# Patient Record
Sex: FEMALE
[De-identification: names, ages, dates, MRNs, and addresses within clinical notes are randomized per-mention and may not be internally consistent; named-entity substitution may affect disease eponyms.]

---

## 2017-04-10 ENCOUNTER — RX ONLY (OUTPATIENT)
Age: 57
Setting detail: RX ONLY
End: 2017-04-10

## 2018-02-19 ENCOUNTER — RX ONLY (OUTPATIENT)
Age: 58
Setting detail: RX ONLY
End: 2018-02-19

## 2018-03-12 ENCOUNTER — RX ONLY (OUTPATIENT)
Age: 58
Setting detail: RX ONLY
End: 2018-03-12

## 2018-03-21 ENCOUNTER — RX ONLY (OUTPATIENT)
Age: 58
Setting detail: RX ONLY
End: 2018-03-21

## 2018-10-29 ENCOUNTER — RX ONLY (OUTPATIENT)
Age: 58
Setting detail: RX ONLY
End: 2018-10-29

## 2018-10-29 RX ORDER — HYDROCORTISONE BUTYRATE 1 MG/G
CREAM TOPICAL
Qty: 1 | Refills: 1 | COMMUNITY
Start: 2018-10-29

## 2019-01-11 ENCOUNTER — APPOINTMENT (RX ONLY)
Dept: URBAN - METROPOLITAN AREA CLINIC 323 | Facility: CLINIC | Age: 59
Setting detail: DERMATOLOGY
End: 2019-01-11

## 2019-01-11 DIAGNOSIS — D22 MELANOCYTIC NEVI: ICD-10-CM

## 2019-01-11 DIAGNOSIS — L90.5 SCAR CONDITIONS AND FIBROSIS OF SKIN: ICD-10-CM

## 2019-01-11 DIAGNOSIS — L81.7 PIGMENTED PURPURIC DERMATOSIS: ICD-10-CM

## 2019-01-11 DIAGNOSIS — L81.4 OTHER MELANIN HYPERPIGMENTATION: ICD-10-CM

## 2019-01-11 DIAGNOSIS — L82.1 OTHER SEBORRHEIC KERATOSIS: ICD-10-CM

## 2019-01-11 PROBLEM — D22.5 MELANOCYTIC NEVI OF TRUNK: Status: ACTIVE | Noted: 2019-01-11

## 2019-01-11 PROBLEM — L24.89 IRRITANT CONTACT DERMATITIS DUE TO OTHER AGENTS: Status: ACTIVE | Noted: 2019-01-11

## 2019-01-11 PROBLEM — D239 BENIGN NEOPLASM OF SKIN, SITE UNSPECIFIED: Status: ACTIVE | Noted: 2019-01-11

## 2019-01-11 PROBLEM — D36.7 BENIGN NEOPLASM OF OTHER SPECIFIED SITES: Status: ACTIVE | Noted: 2019-01-11

## 2019-01-11 PROBLEM — L01.09 OTHER IMPETIGO: Status: ACTIVE | Noted: 2019-01-11

## 2019-01-11 PROBLEM — R20.8 OTHER DISTURBANCES OF SKIN SENSATION: Status: ACTIVE | Noted: 2019-01-11

## 2019-01-11 PROBLEM — D22.9 MELANOCYTIC NEVI, UNSPECIFIED: Status: ACTIVE | Noted: 2019-01-11

## 2019-01-11 PROBLEM — L01.00 IMPETIGO, UNSPECIFIED: Status: ACTIVE | Noted: 2019-01-11

## 2019-01-11 PROBLEM — L24.4 IRRITANT CONTACT DERMATITIS DUE TO DRUGS IN CONTACT WITH SKIN: Status: ACTIVE | Noted: 2019-01-11

## 2019-01-11 PROCEDURE — ? COUNSELING

## 2019-01-11 PROCEDURE — ? SUNSCREEN RECOMMENDATIONS

## 2019-01-11 PROCEDURE — ? PRESCRIPTION

## 2019-01-11 PROCEDURE — 99213 OFFICE O/P EST LOW 20 MIN: CPT

## 2019-01-11 PROCEDURE — ? PRESCRIPTION SAMPLES PROVIDED

## 2019-01-11 RX ORDER — TRIAMCINOLONE ACETONIDE 1 MG/G
OINTMENT TOPICAL
Qty: 1 | Refills: 3 | Status: CANCELLED
Stop reason: NON-AVAIL

## 2019-01-11 RX ORDER — HYP AC/SOD CHL/SOD SUL/SOD PHO 0.009 %
SPRAY, NON-AEROSOL (ML) TOPICAL
Qty: 1 | Refills: 4 | Status: CANCELLED
Stop reason: NON-AVAIL

## 2019-01-11 ASSESSMENT — LOCATION DETAILED DESCRIPTION DERM
LOCATION DETAILED: LEFT PROXIMAL PRETIBIAL REGION
LOCATION DETAILED: RIGHT PROXIMAL PRETIBIAL REGION
LOCATION DETAILED: RIGHT INFERIOR CENTRAL MALAR CHEEK

## 2019-01-11 ASSESSMENT — LOCATION ZONE DERM
LOCATION ZONE: LEG
LOCATION ZONE: FACE

## 2019-01-11 ASSESSMENT — LOCATION SIMPLE DESCRIPTION DERM
LOCATION SIMPLE: RIGHT PRETIBIAL REGION
LOCATION SIMPLE: RIGHT CHEEK
LOCATION SIMPLE: LEFT PRETIBIAL REGION

## 2023-11-09 ENCOUNTER — APPOINTMENT (RX ONLY)
Dept: URBAN - METROPOLITAN AREA CLINIC 133 | Facility: CLINIC | Age: 63
Setting detail: DERMATOLOGY
End: 2023-11-09

## 2023-11-09 DIAGNOSIS — L81.7 PIGMENTED PURPURIC DERMATOSIS: ICD-10-CM

## 2023-11-09 DIAGNOSIS — D22 MELANOCYTIC NEVI: ICD-10-CM

## 2023-11-09 DIAGNOSIS — R23.8 OTHER SKIN CHANGES: ICD-10-CM

## 2023-11-09 DIAGNOSIS — L57.8 OTHER SKIN CHANGES DUE TO CHRONIC EXPOSURE TO NONIONIZING RADIATION: ICD-10-CM

## 2023-11-09 DIAGNOSIS — L82.1 OTHER SEBORRHEIC KERATOSIS: ICD-10-CM

## 2023-11-09 DIAGNOSIS — Z85.828 PERSONAL HISTORY OF OTHER MALIGNANT NEOPLASM OF SKIN: ICD-10-CM

## 2023-11-09 PROBLEM — D22.5 MELANOCYTIC NEVI OF TRUNK: Status: ACTIVE | Noted: 2023-11-09

## 2023-11-09 PROCEDURE — ? ADDITIONAL NOTES

## 2023-11-09 PROCEDURE — ? COUNSELING

## 2023-11-09 PROCEDURE — 99203 OFFICE O/P NEW LOW 30 MIN: CPT

## 2023-11-09 ASSESSMENT — LOCATION ZONE DERM
LOCATION ZONE: FACE
LOCATION ZONE: LEG
LOCATION ZONE: LEG
LOCATION ZONE: TRUNK

## 2023-11-09 ASSESSMENT — LOCATION SIMPLE DESCRIPTION DERM
LOCATION SIMPLE: LEFT THIGH
LOCATION SIMPLE: RIGHT FOREHEAD
LOCATION SIMPLE: RIGHT PRETIBIAL REGION
LOCATION SIMPLE: LEFT UPPER BACK
LOCATION SIMPLE: RIGHT CHEEK
LOCATION SIMPLE: LEFT PRETIBIAL REGION

## 2023-11-09 ASSESSMENT — LOCATION DETAILED DESCRIPTION DERM
LOCATION DETAILED: LEFT DISTAL PRETIBIAL REGION
LOCATION DETAILED: LEFT ANTERIOR PROXIMAL THIGH
LOCATION DETAILED: LEFT MID-UPPER BACK
LOCATION DETAILED: RIGHT DISTAL PRETIBIAL REGION
LOCATION DETAILED: RIGHT INFERIOR FOREHEAD
LOCATION DETAILED: RIGHT CENTRAL MALAR CHEEK

## 2023-11-09 NOTE — PROCEDURE: COUNSELING
Detail Level: Generalized
Patient Specific Counseling (Will Not Stick From Patient To Patient): Recommended sun avoidance during peak hours from 10a - 4p, broad-brimmed hats, reapplying sunscreen when outdoors for greater than 2 hours
Detail Level: Detailed
Patient Specific Counseling (Will Not Stick From Patient To Patient): rec'd supplement with vitamin C + rutoside 50 mg BID

## 2023-11-09 NOTE — PROCEDURE: ADDITIONAL NOTES
Detail Level: Simple
[email or web address]
Render Risk Assessment In Note?: no
Additional Notes: recently got cheek filler with provider in [place] which she did not like (extended from the zygomatic cheek onto temples which she did not like, de Karley technique?)\\n\\nshe has prominent buccal fat pad and discussed that this could be treated by excision with a Plastic Surgeon (she would not do this) or possibly radiofrequency devices\\n\\nphotos taken today, she may schedule tox and filler on same visit\\n\\nshe would be amenable to  the apex of the cheek and also the anterior jawline to create a smooth transition from jowl to chin, would favor either Voluma or Juvederm Ultra Plus. some volume loss in prejowl sulcus but would only fill this if using Ultra Plus

## 2023-12-01 ENCOUNTER — APPOINTMENT (RX ONLY)
Dept: URBAN - METROPOLITAN AREA CLINIC 133 | Facility: CLINIC | Age: 63
Setting detail: DERMATOLOGY
End: 2023-12-01

## 2023-12-01 DIAGNOSIS — Z41.9 ENCOUNTER FOR PROCEDURE FOR PURPOSES OTHER THAN REMEDYING HEALTH STATE, UNSPECIFIED: ICD-10-CM

## 2023-12-01 PROCEDURE — ? COSMETIC CONSULTATION: CLEAR AND BRILLIANT

## 2023-12-01 PROCEDURE — ? COSMETIC CONSULTATION: PRODUCTS

## 2023-12-01 PROCEDURE — ? VBEAM PERFECTA LASER

## 2023-12-01 PROCEDURE — ? INVENTORY

## 2023-12-01 ASSESSMENT — LOCATION SIMPLE DESCRIPTION DERM
LOCATION SIMPLE: RIGHT CHEEK
LOCATION SIMPLE: LEFT CHEEK
LOCATION SIMPLE: NOSE
LOCATION SIMPLE: SUPERIOR FOREHEAD

## 2023-12-01 ASSESSMENT — LOCATION DETAILED DESCRIPTION DERM
LOCATION DETAILED: RIGHT LATERAL MALAR CHEEK
LOCATION DETAILED: LEFT LATERAL BUCCAL CHEEK
LOCATION DETAILED: RIGHT SUPERIOR LATERAL BUCCAL CHEEK
LOCATION DETAILED: RIGHT MEDIAL MALAR CHEEK
LOCATION DETAILED: SUPERIOR MID FOREHEAD
LOCATION DETAILED: NASAL SUPRATIP
LOCATION DETAILED: LEFT LATERAL MALAR CHEEK
LOCATION DETAILED: NASAL DORSUM
LOCATION DETAILED: LEFT SUPERIOR LATERAL BUCCAL CHEEK
LOCATION DETAILED: RIGHT LATERAL BUCCAL CHEEK

## 2023-12-01 ASSESSMENT — LOCATION ZONE DERM
LOCATION ZONE: NOSE
LOCATION ZONE: FACE

## 2023-12-01 NOTE — PROCEDURE: VBEAM PERFECTA LASER
Spray Time (Ms): 0
Is There A Fourth Setting?: no
Spray Time (Ms): 30
Delay Time (Ms): 20
Post-Care Instructions: I reviewed with the patient in detail post-care instructions.  Cool compresses or cold gel packs may be applied after treatment.  Exposure to the sun should be avoided and sun protection and sunscreen should be used.
Spot Size: 10 mm
Pulse Duration: 1.5 ms
Pulse Duration: 10 ms
Detail Level: Zone
Fluence (J/Cm2): 7
Spot Size: 3x10 mm
Pre-Procedure: The treatment areas identified by the patient were cleansed and treatment was performed with the parameters mentioned above.
Treatment Number: 1
Spot Size: 7 mm
Post-Procedure: Following the procedure the post care instructions were reviewed with the patient.
Is There A Second Setting?: yes
Location: face
Spot Size: 7 mm PL
Skin Type (Optional): III
Pulse Duration: 40 ms
Fluence (J/Cm2): 10
Consent: Written consent obtained.  Risks were reviewed including but not limited to crusting, scabbing, blistering, scarring, darker or lighter pigmentary change, bruising, and/or incomplete response.